# Patient Record
Sex: MALE | Race: WHITE
[De-identification: names, ages, dates, MRNs, and addresses within clinical notes are randomized per-mention and may not be internally consistent; named-entity substitution may affect disease eponyms.]

---

## 2020-10-17 NOTE — EDM.PDOC
ED HPI GENERAL MEDICAL PROBLEM





- General


Chief Complaint: Trauma


Stated Complaint: fall, back pain


Time Seen by Provider: 10/17/20 15:42


Source of Information: Reports: Patient


History Limitations: Reports: No Limitations





- History of Present Illness


INITIAL COMMENTS - FREE TEXT/NARRATIVE: 





Patient trying to clean gutters and was around 5 feet off ground when ladder 

gave way.  He fell on ground (grass).  No initial LOC.  Did get up off the 

ground and apparently had syncopal episode around a minute later when he woke 

again lying on grass.  Complaints of bump on back of head and some soreness of 

lumbar area back.  Mild scrape right forearm. No other complaints. 





- Related Data


                                    Allergies











Allergy/AdvReac Type Severity Reaction Status Date / Time


 


No Known Allergies Allergy   Verified 10/17/20 15:50











Home Meds: 


                                    Home Meds





Ascorbic Acid [Vitamin C] 1 tab PO DAILY 10/17/20 [History]


Cholecalciferol (Vitamin D3) [Vitamin D3] 1 tab PO DAILY 10/17/20 [History]


Lutein/Minerals/Vit A,C & E [I-Yevgeniy] 1 tab PO DAILY 10/17/20 [History]


Multivitamin 1 each PO DAILY 10/17/20 [History]


lisinopriL [Lisinopril] 5 mg PO DAILY 10/17/20 [History]











Past Medical History


Cardiovascular History: Reports: Hypertension





Review of Systems





- Review of Systems


Review Of Systems: See Below


Constitutional: Reports: No Symptoms


Eyes: Denies: Blurred Vision, Decreased Acuity, Foreign Body Sensation, 

Photophobia, Vision Change


Ears: Reports: No Symptoms


Nose: Reports: No Symptoms


Mouth/Throat: Reports: No Symptoms


Respiratory: Reports: No Symptoms


Cardiovascular: Reports: No Symptoms.  Denies: Chest Pain


GI/Abdominal: Reports: No Symptoms.  Denies: Abdominal Pain


Genitourinary: Reports: No Symptoms


Musculoskeletal: Reports: Back Pain (lumbar).  Denies: Neck Pain, Joint Swelling


Skin: Reports: Lumps (back of head), Other (abrasion right arm)


Neurological: Reports: Headache, Syncope.  Denies: Difficulty Walking, Change in

 Speech, Gait Disturbance


Psychiatric: Reports: No Symptoms





ED EXAM, GENERAL





- Physical Exam


Exam: See Below


Exam Limited By: No Limitations


General Appearance: Alert, WD/WN, No Apparent Distress, Other (ambulated into ER

 without problem)


Eye Exam: Bilateral Eye: EOMI, PERRL


Ears: Normal External Exam, Normal Canal


Nose: No: Nasal Deformity, Nasal Swelling, Nasal Drainage


Throat/Mouth: Normal Lips, Normal Teeth, Normal Voice, No Airway Compromise


Head: Other (bump on back of head).  No: Facial Swelling, Facial Tenderness, 

Sinus Tenderness


Neck: Normal Inspection, Supple, Non-Tender, Full Range of Motion.  No: 

Lymphadenopathy (L), Lymphadenopathy (R), Tender Lateral, Tender Midline


Respiratory/Chest: No Respiratory Distress, Lungs Clear, Normal Breath Sounds, 

Chest Non-Tender


Cardiovascular: Regular Rate, Rhythm, No Edema, No Murmur


GI/Abdominal: Normal Bowel Sounds, Soft, Non-Tender, No Distention


 (Male) Exam: Deferred


Rectal (Males) Exam: Deferred


Back Exam: Paraspinal Tenderness (mild/bilateral lower back).  No: Muscle Spasm,

 Vertebral Tenderness


Extremities: Normal Inspection, Normal Range of Motion, Non-Tender, No Pedal 

Edema, Normal Capillary Refill


Neurological: Alert, Oriented, CN II-XII Intact, Normal Cognition, Normal Gait, 

No Motor/Sensory Deficits


Psychiatric: Normal Affect, Normal Mood


Skin Exam: Warm, Dry, Normal Color, Wound/Incision (mild abrasion right forearm,

 bump on back of head)





Course





- Orders/Labs/Meds


Orders: 


                               Active Orders 24 hr











 Category Date Time Status


 


 Head wo Cont [CT] Stat Exams  10/17/20 15:43 Taken


 


 Lumbar Spine 2 or 3V [CR] Stat Exams  10/17/20 15:43 Taken


 


 UA W/MICROSCOPIC [URIN] Stat Lab  10/17/20 15:42 Ordered











Labs: 


                                Laboratory Tests











  10/17/20 10/17/20 Range/Units





  15:45 15:45 


 


WBC  9.0   (4.0-10.2)  K/uL


 


RBC  5.00   (4.33-5.41)  M/uL


 


Hgb  15.8   (13.1-16.8)  g/dL


 


Hct  46.2   (39.0-49.0)  %


 


MCV  92.4   (84.0-98.0)  fL


 


MCH  31.6   (28.2-33.3)  pg


 


MCHC  34.2   (31.7-36.0)  g/dL


 


RDW  12.6   (11.2-14.1)  %


 


Plt Count  222   (150-350)  K/uL


 


Neut % (Auto)  69.2   (45.0-80.0)  %


 


Lymph % (Auto)  19.9   (10.0-50.0)  %


 


Mono % (Auto)  10.0   (2.0-14.0)  %


 


Eos % (Auto)  0.7   (0.0-5.0)  %


 


Baso % (Auto)  0.2   (0.0-2.0)  %


 


Neut # (Auto)  6.23   (1.40-7.00)  K/uL


 


Lymph # (Auto)  1.79   (0.50-3.50)  K/uL


 


Mono # (Auto)  0.90   (0.00-1.00)  K/uL


 


Eos # (Auto)  0.06   (0.00-0.50)  K/uL


 


Baso # (Auto)  0.02   (0.00-0.20)  K/uL


 


Sodium   139  (136-145)  mmol/L


 


Potassium   4.9  (3.5-5.1)  mmol/L


 


Chloride   102  ()  mmol/L


 


Carbon Dioxide   27.4  (21.0-32.0)  mmol/L


 


BUN   24 H  (7-18)  mg/dL


 


Creatinine   1.32 H  (0.51-1.17)  mg/dL


 


Est Cr Clr Drug Dosing   TNP  


 


Estimated GFR (MDRD)   55  mL/min


 


Glucose   103  ()  mg/dL


 


Calcium   9.2  (8.5-10.1)  mg/dL


 


Total Bilirubin   0.5  (0.2-1.0)  mg/dL


 


AST   19  (15-37)  U/L


 


ALT   34  (12-78)  U/L


 


Alkaline Phosphatase   60  ()  IU/L


 


Total Protein   7.2  (6.4-8.2)  g/dL


 


Albumin   3.9  (3.4-5.0)  g/dL











Meds: 


Medications














Discontinued Medications














Generic Name Dose Route Start Last Admin





  Trade Name Freq  PRN Reason Stop Dose Admin


 


Tramadol HCl  50 mg  10/17/20 17:20 





  Ultram  PO  10/17/20 17:21 





  ONETIME ONE  














- Re-Assessments/Exams


Free Text/Narrative Re-Assessment/Exam: 





10/17/20 17:30


Trauma code called upon arrival. Downgraded after initial physical exam. 


CT of head obtained given height of fall/head contusion/syncopal episode.  

Negative for acute injury/bleed per Radiology.  CBC/Chem obtained. Mild renal 

insufficiency noted.  Patient unable to void during stay in ER.  LUmbar films 

unremarkable for acute injury.  





No changes observed during stay.  Vital signs stable. Neurologically stable.  Ok

 to return home.  To follow up if blood noted in urine.  To follow up if any 

neuro changes are noted.  To call if they have any concerns.  No NSAIDS for now 

given renal changes.  To follow up for recheck of BMP in November to see if 

Bun/Cr still mildly elevated. Patient encouraged to drink water/stay hydrated.  

Precautions reviewed prior to discharge. 


10/17/20 17:33








Departure





- Departure


Time of Disposition: 17:18


Disposition: Home, Self-Care 01


Condition: Good


Clinical Impression: 


 Lumbar back pain





Fall


Qualifiers:


 Encounter type: initial encounter Qualified Code(s): W19.XXXA - Unspecified 

fall, initial encounter





Head contusion


Qualifiers:


 Encounter type: initial encounter Contusion of head detail: scalp Qualified 

Code(s): S00.03XA - Contusion of scalp, initial encounter








- Discharge Information


*PRESCRIPTION DRUG MONITORING PROGRAM REVIEWED*: Not Applicable


*COPY OF PRESCRIPTION DRUG MONITORING REPORT IN PATIENT SAUNDRA: Not Applicable


Instructions:  Head Injury, Adult


Referrals: 


Echo Drake NP [Primary Care Provider] - 


Forms:  ED Department Discharge


Additional Instructions: 


Take it easy tonight and tomorrow.  Ice sore areas.  Tylenol OK for pain. Return

to ER if any sudden worsening problems noted.  Call if you have any questions.  

Follow up with your primary provider sometime in November and get your kidney 

function rechecked.  If the numbers are still a little high then discuss things 

to avoid such as Ibuprofen/Aleve that can make kidney function worse. 





- My Orders


Last 24 Hours: 


My Active Orders





10/17/20 15:42


UA W/MICROSCOPIC [URIN] Stat 





10/17/20 15:43


Head wo Cont [CT] Stat 


Lumbar Spine 2 or 3V [CR] Stat 














- Assessment/Plan


Last 24 Hours: 


My Active Orders





10/17/20 15:42


UA W/MICROSCOPIC [URIN] Stat 





10/17/20 15:43


Head wo Cont [CT] Stat 


Lumbar Spine 2 or 3V [CR] Stat

## 2022-06-30 ENCOUNTER — HOSPITAL ENCOUNTER (OUTPATIENT)
Dept: HOSPITAL 52 - LL.SDS | Age: 67
Discharge: HOME | End: 2022-06-30
Attending: SURGERY
Payer: MEDICARE

## 2022-06-30 DIAGNOSIS — R35.0: ICD-10-CM

## 2022-06-30 DIAGNOSIS — D12.2: ICD-10-CM

## 2022-06-30 DIAGNOSIS — Z79.899: ICD-10-CM

## 2022-06-30 DIAGNOSIS — B49: ICD-10-CM

## 2022-06-30 DIAGNOSIS — Z12.11: Primary | ICD-10-CM

## 2022-06-30 DIAGNOSIS — I10: ICD-10-CM

## 2022-06-30 DIAGNOSIS — R25.1: ICD-10-CM
